# Patient Record
Sex: FEMALE | Race: WHITE | HISPANIC OR LATINO | Employment: UNEMPLOYED | URBAN - METROPOLITAN AREA
[De-identification: names, ages, dates, MRNs, and addresses within clinical notes are randomized per-mention and may not be internally consistent; named-entity substitution may affect disease eponyms.]

---

## 2023-02-08 ENCOUNTER — VBI (OUTPATIENT)
Dept: ADMINISTRATIVE | Facility: OTHER | Age: 28
End: 2023-02-08

## 2023-02-20 ENCOUNTER — OFFICE VISIT (OUTPATIENT)
Dept: FAMILY MEDICINE CLINIC | Facility: CLINIC | Age: 28
End: 2023-02-20

## 2023-02-20 VITALS
DIASTOLIC BLOOD PRESSURE: 80 MMHG | WEIGHT: 264 LBS | TEMPERATURE: 98.4 F | SYSTOLIC BLOOD PRESSURE: 129 MMHG | HEIGHT: 65 IN | BODY MASS INDEX: 43.99 KG/M2 | HEART RATE: 56 BPM

## 2023-02-20 DIAGNOSIS — Z59.819 HOUSING INSTABILITY: ICD-10-CM

## 2023-02-20 DIAGNOSIS — R20.0 NUMBNESS: Primary | ICD-10-CM

## 2023-02-20 DIAGNOSIS — Z23 NEED FOR INFLUENZA VACCINATION: ICD-10-CM

## 2023-02-20 DIAGNOSIS — R21 RASH: ICD-10-CM

## 2023-02-20 SDOH — ECONOMIC STABILITY - HOUSING INSECURITY: HOUSING INSTABILITY UNSPECIFIED: Z59.819

## 2023-02-20 NOTE — ASSESSMENT & PLAN NOTE
Chronic mildly pruritic pinpoint erythematous rash appearing on different parts of body, since 2012  Currently with rash on the left shoulder  Patient reports being worked up for scabies versus different conditions in the past with no answer  Has tried previous management including steroids with no improvement  Unclear etiology  Advised patient to continue adequate moisturization    Referral given to dermatology given chronic history and per patient's request

## 2023-02-20 NOTE — ASSESSMENT & PLAN NOTE
Right-sided 4th digit numbness, for the last 2 weeks  Phalen's test positive  Most likely appears to be carpal tunnel syndrome  Conservative management with wrist splint  Advised patient to follow-up with worsening of symptoms

## 2023-02-20 NOTE — PROGRESS NOTES
Name: Aurelio George      : 1995      MRN: [id number]  Encounter Provider: Anamika Pierce MD  Encounter Date: 2023   Encounter department: [address] [place] [address]  Numbness  Assessment & Plan:  Right-sided 4th digit numbness, for the last 2 weeks  Phalen's test positive  Most likely appears to be carpal tunnel syndrome  Conservative management with wrist splint  Advised patient to follow-up with worsening of symptoms  Orders:  -     Elastic Bandages & Supports (Wrist Splint/Right Medium) MISC; Use 2 (two) times a day    2  Need for influenza vaccination  -     influenza vaccine, quadrivalent, 0 5 mL, preservative-free, for adult and pediatric patients 6 mos+ (AFLURIA, FLUARIX, FLULAVAL, FLUZONE)    3  Rash  Assessment & Plan:  Chronic mildly pruritic pinpoint erythematous rash appearing on different parts of body, since   Currently with rash on the left shoulder  Patient reports being worked up for scabies versus different conditions in the past with no answer  Has tried previous management including steroids with no improvement  Unclear etiology  Advised patient to continue adequate moisturization  Referral given to dermatology given chronic history and per patient's request     Orders:  -     Ambulatory Referral to Dermatology; Future    4  Housing instability  -     Ambulatory Referral to Social Work Care Management Program; Future         Subjective      61-year-old female presents today to establish care  Patient complains of rash on the left shoulder  Reports she has had chronic rash for over 10 years, previously worked up with no clear etiology  Patient reports rash being mildly pruritic, previously has tried treatment options including steroids with no significant improvement  Patient also currently having housing instability issues  Has a son and reports difficulty in finding stable housing    Reports numbness of the fourth digit on the right hand, started 2 weeks ago, no recent trauma or injury  No weakness of the hand  No other acute concerns  Review of Systems   Constitutional: Negative for chills and fever  HENT: Negative for ear pain and sore throat  Eyes: Negative for pain and visual disturbance  Respiratory: Negative for cough and shortness of breath  Cardiovascular: Negative for chest pain and palpitations  Gastrointestinal: Negative for abdominal pain and vomiting  Genitourinary: Negative for difficulty urinating  Skin: Positive for rash  Negative for color change  Neurological: Positive for numbness  All other systems reviewed and are negative  No current outpatient medications on file prior to visit  Objective     /80 (BP Location: Left arm, Patient Position: Sitting, Cuff Size: Large)   Pulse 56   Temp 98 4 °F (36 9 °C) (Temporal)   Ht 5' 5" (1 651 m)   Wt 120 kg (264 lb)   BMI 43 93 kg/m²     Physical Exam  Vitals reviewed  Constitutional:       General: She is not in acute distress  Appearance: Normal appearance  HENT:      Head: Normocephalic and atraumatic  Eyes:      Conjunctiva/sclera: Conjunctivae normal    Cardiovascular:      Rate and Rhythm: Normal rate and regular rhythm  Pulses: Normal pulses  Heart sounds: Normal heart sounds  Pulmonary:      Effort: Pulmonary effort is normal  No respiratory distress  Musculoskeletal:         General: No swelling, tenderness, deformity or signs of injury  Normal range of motion  Right lower leg: No edema  Left lower leg: No edema  Comments: Phalen's positive right wrist   Skin:     General: Skin is warm and dry  Findings: Rash present  Comments: Erythematous rash pinpoint( due to scratching) on left shoulder  Neurological:      Mental Status: She is alert and oriented to person, place, and time        Gait: Gait normal    Psychiatric:         Mood and Affect: Mood normal  Behavior: Behavior normal        Rush Zelaya MD

## 2023-02-28 ENCOUNTER — OFFICE VISIT (OUTPATIENT)
Dept: OBGYN CLINIC | Facility: CLINIC | Age: 28
End: 2023-02-28

## 2023-02-28 VITALS
DIASTOLIC BLOOD PRESSURE: 72 MMHG | HEIGHT: 65 IN | BODY MASS INDEX: 44.89 KG/M2 | SYSTOLIC BLOOD PRESSURE: 138 MMHG | WEIGHT: 269.4 LBS | HEART RATE: 80 BPM

## 2023-02-28 DIAGNOSIS — Z59.819 HOUSING INSTABILITY: ICD-10-CM

## 2023-02-28 DIAGNOSIS — Z01.419 WOMEN'S ANNUAL ROUTINE GYNECOLOGICAL EXAMINATION: Primary | ICD-10-CM

## 2023-02-28 SDOH — ECONOMIC STABILITY - HOUSING INSECURITY: HOUSING INSTABILITY UNSPECIFIED: Z59.819

## 2023-02-28 NOTE — ASSESSMENT & PLAN NOTE
G/C & Pap smear collected 2/28/2023  Contraception: *  No other complaints at this time     Benign physical exam  F/u in 1 year or as needed

## 2023-02-28 NOTE — PROGRESS NOTES
Subjective      Dayan Cottrell is a 32 y o  female who presents for annual well woman exam  She does report some intermittent RLQ pain which is not severe and not persistent  She is unaware of any triggers or alleviating factors  GYN:  · Denies vaginal discharge, labial erythema or lesions  · Menarche at 10  · Menses are regular, q 28 days, lasting 3-4 days, but they are intermittently heavy - when she's got a heavy day she'll change a super pad about 5 times a day  · Contraception: Nothing  · Patient is not sexually active, denies any history dyspareunia  She has current low libido  · Has not received the Gardasil vaccine  · Denies gynecologic surgeries  OB:  ·  female  · Pregnancies were term delivery, had gestational hypertension  · Her son has autism    :  · Denies dysuria, urinary frequency or urgency  · Denies hematuria, flank pain  · Endorses stress incontinence  Breast:  · Denies breast mass, skin changes, dimpling, reddening, nipple retraction  · Denies breast discharge  · Patient does not have a family history of breast, endometrial, colon, or ovarian ca  General:  · Diet: She's got days when she eats and days when she doesn't  She doesn't eat sweets, is trying to drink less soda  · Exercise: walking with her son  · Work: Not currently  · ETOH use: never  · Tobacco use: denies  · Recreational drug use: denies  · She is having a number of social stressors- she's been homeless recently and has been caring for her autistic son    Screening:  · Cervical cancer: last pap smear 2014 per patient was normal  2015 - ASCUS +OHR HPV  · Breast cancer: Due to start age 34  · Colon cancer:Due to start age 46  · STD screening: declines  Review of Systems  Pertinent items are noted in HPI  Objective      /72   Pulse 80   Ht 5' 5" (1 651 m)   Wt 122 kg (269 lb 6 4 oz)   LMP 2023   BMI 44 83 kg/m²     Physical Exam  Exam conducted with a chaperone present  Constitutional:       General: She is not in acute distress  Appearance: She is well-developed  She is obese  She is not diaphoretic  HENT:      Head: Normocephalic and atraumatic  Eyes:      General: No scleral icterus  Right eye: No discharge  Left eye: No discharge  Conjunctiva/sclera: Conjunctivae normal    Neck:      Thyroid: No thyroid mass or thyroid tenderness  Cardiovascular:      Rate and Rhythm: Normal rate and regular rhythm  Heart sounds: Normal heart sounds  No murmur heard  No friction rub  No gallop  Pulmonary:      Effort: Pulmonary effort is normal  No respiratory distress  Breath sounds: Normal breath sounds  No stridor  No wheezing or rales  Chest:      Chest wall: No tenderness  Breasts:     Breasts are symmetrical       Right: Normal  No mass, nipple discharge, skin change or tenderness  Left: Normal  No mass, nipple discharge, skin change or tenderness  Abdominal:      General: There is no distension  Palpations: Abdomen is soft  There is no mass  Tenderness: There is no abdominal tenderness  There is no guarding or rebound  Hernia: No hernia is present  There is no hernia in the left inguinal area or right inguinal area  Genitourinary:     General: Normal vulva  Exam position: Supine  Labia:         Right: No rash, tenderness, lesion or injury  Left: No rash, tenderness, lesion or injury  Vagina: No signs of injury and foreign body  No vaginal discharge, erythema, tenderness or bleeding  Cervix: No cervical motion tenderness, discharge or friability  Uterus: Not deviated, not enlarged, not fixed and not tender  Adnexa:         Right: No fullness  Left: No fullness  Rectum: Normal       Comments: Parous cervix  No masses or tenderness appreciated on bimanual exam, limited by body habitus  Musculoskeletal:         General: No tenderness or deformity   Normal range of motion  Cervical back: Normal range of motion and neck supple  Right lower leg: No edema  Left lower leg: No edema  Lymphadenopathy:      Cervical: No cervical adenopathy  Upper Body:      Right upper body: No supraclavicular or axillary adenopathy  Left upper body: No supraclavicular or axillary adenopathy  Skin:     General: Skin is warm and dry  Findings: No rash  Neurological:      General: No focal deficit present  Mental Status: She is alert  Mental status is at baseline  Psychiatric:         Mood and Affect: Mood normal          Behavior: Behavior normal                  Assessment       Yumiko Roberson is a 32 y o  No obstetric history on file  who presents for annual well woman exam     Plan     1  Routine well woman exam done today  2   Pap and HPV: last performed 2015 - ASCUS OHR HPV+, per ASCCP guidelines, pap smear due today  Will call patient with results  3  The patient declined STD testing  Safe sex practices have been discussed  The patient is not sexually active  Contraception: briefly discussed options  Patient will return if she desires further discussion  4  The following were reviewed in today's visit: routine health wellness, stress urinary incontinence, HPV and pap smears, as well as depression  5  Stress incontinence: discussed that can review other interventions at future visits, but that some primary interventions include strengthening of pelvic floor with Kegels  6  Discussed HPV as sexually transmitted disease and implications in majority of cervical cancer cases  Discussed Gardasil vaccine as means of preventing HPV infection  She is interested  We do not currently have vaccine in stock, will call her when available  7  Discussed depression and social stressors  She has been seen recently by her PCP who put in for CMSW  She is amenable to being contacted by  for resources  8  BMI Counseling: Body mass index is 44 83 kg/m²   The BMI is above normal  Nutrition recommendations include decreasing overall calorie intake, consuming healthier snacks, decreasing soda and/or juice intake and increasing intake of lean protein  Exercise recommendations include moderate aerobic physical activity for 150 minutes/week  9  Depression Screening Follow-up Plan: Patient's depression screening was positive with a PHQ-2 score of 4  Their PHQ-9 score was 12  Patient assessed for underlying major depression  They have no active suicidal ideations  Brief counseling provided and recommend additional follow-up/re-evaluation next office visit  She is establishing care with mental health provider through PCP         Patient to return to office for discussion of RLQ pain, stress urinary incontinence, contraception, or Gardasil vaccination      Patient discussed with Dr Ana Toledo MD  OB/Gyn R-4  02/28/23

## 2023-03-01 ENCOUNTER — PATIENT OUTREACH (OUTPATIENT)
Dept: OBGYN CLINIC | Facility: CLINIC | Age: 28
End: 2023-03-01

## 2023-03-01 NOTE — PROGRESS NOTES
BEATRIS MANCILLA spoke with 33 y/o-S-G1- English speaking woman for assessment  Pt reported she was evicted form her apartment last January after she could not pay her rent that went up $400 00 more  Pt and 10 y/o autistic son are staying with her mom but need to find her own place by April 1st  Pt is calling 211 daily for shelter  Pt stated she met with Carmen Bonilla and they will help with security deposit and first month rent  Pt has not been able to find an apartment due to the recent eviction  Pt has MA, SNAP, SSI and recently filed for Child support  Pt is unemployed due to  issues  Pt  stated she has been trying to get Title XX unsuccessfully  Pt does have transportation needs  Pt also with high depression score with no SI / HI  BEATRIS MANCILLA explained her role and provided supportive counseling  BEATRIS MANCILLA discussed getting services for her son and provided with [facility] phone numbers  Pt will call to inquire about their services for children with Autism  BEATRIS MANCILLA discussed applying for [place] services and Pt will call back tomorrow to apply for the emergency services  BEATRIS MANCILLA discussed referral for [address] services and Pt was agreeable  BEATRIS MANCILLA offered assistance to schedule intake appointment but Pt requested the phone numbers to call herself  Pt denies other concerns and thanks BEATRIS MANCILLA for reaching out  Pt was encouraged to call at any time needed

## 2023-03-07 ENCOUNTER — TELEPHONE (OUTPATIENT)
Dept: OBGYN CLINIC | Facility: CLINIC | Age: 28
End: 2023-03-07

## 2023-03-07 ENCOUNTER — PATIENT OUTREACH (OUTPATIENT)
Dept: FAMILY MEDICINE CLINIC | Facility: CLINIC | Age: 28
End: 2023-03-07

## 2023-03-07 LAB
LAB AP GYN PRIMARY INTERPRETATION: NORMAL
Lab: NORMAL

## 2023-03-07 NOTE — TELEPHONE ENCOUNTER
Spoke with pt today , notified of normal pap, recommendation for screening in 3 years and importance of annual exams yearly

## 2023-03-07 NOTE — TELEPHONE ENCOUNTER
----- Message from Charline Granados MD sent at 3/7/2023 10:35 AM EST -----  Pap smear normal  Next pap smear in 3 years per ASCCP guidelines

## 2023-03-07 NOTE — PROGRESS NOTES
BEATRIS received a new referral in regard to pt with housing instability  BEATRIS contacted pt and introduced self and role  Pt shared that she and her son are currently staying in between her mothers and grandmothers home  Pt and her son are not homeless  She shares that she would prefer a place of her own, because her son is autistic and it is difficult for other people to live with him  Pts son receives SSI income of $914 00 per month and that is the only current income in the family  Pt has a job opportunity and is working with her [facility] casework for her son to obtain 21 for childcare so she can work  [facility] also [facility]  They have agreed to pay pts security deposit and first months rent for an apartment  Pt also has some income tax refund money saved  Pt has a prior eviction notice and has been unable to fins a landlord that will rent to her  She reports contacting 30 landlords and none will consider anyone with a prior eviction  Pts  at [facility] is also assisting pt in finding an apartment  Pt has spoke with the manager at [facility] and pt was advised to call at the end of April because they may have an opening at that time  Pt reports no other needs at this time  [facility]  Pt has an active  there that is assisting her  Pt will also continue to work with [facility]  to obtain title 20  BEATRIS did provide pt with resources for [facility] @ [phone] and [facility] @ [phone] form the [facility]  Pt can reach out to them for possible resources  Pt was advised to contact [facility] with any additional questions or concerns  BRIEN will close referral as pt is already working with appropriate community agencies

## 2023-03-15 ENCOUNTER — OFFICE VISIT (OUTPATIENT)
Dept: FAMILY MEDICINE CLINIC | Facility: CLINIC | Age: 28
End: 2023-03-15

## 2023-03-15 VITALS
TEMPERATURE: 97.9 F | OXYGEN SATURATION: 97 % | HEART RATE: 67 BPM | WEIGHT: 284.4 LBS | SYSTOLIC BLOOD PRESSURE: 120 MMHG | DIASTOLIC BLOOD PRESSURE: 60 MMHG | RESPIRATION RATE: 18 BRPM | BODY MASS INDEX: 47.38 KG/M2 | HEIGHT: 65 IN

## 2023-03-15 DIAGNOSIS — F32.89 OTHER DEPRESSION: ICD-10-CM

## 2023-03-15 DIAGNOSIS — Z00.00 ANNUAL PHYSICAL EXAM: Primary | ICD-10-CM

## 2023-03-15 DIAGNOSIS — Z11.1 PPD SCREENING TEST: ICD-10-CM

## 2023-03-15 DIAGNOSIS — R53.83 OTHER FATIGUE: ICD-10-CM

## 2023-03-15 PROBLEM — F32.A DEPRESSION: Status: ACTIVE | Noted: 2023-03-15

## 2023-03-15 RX ORDER — ESCITALOPRAM OXALATE 10 MG/1
10 TABLET ORAL DAILY
Qty: 30 TABLET | Refills: 1 | Status: SHIPPED | OUTPATIENT
Start: 2023-03-15 | End: 2023-05-14

## 2023-03-15 NOTE — ASSESSMENT & PLAN NOTE
Patient with a PHQ-9 score of 11  Patient has no SI/HI today on exam   Reports that she has been feeling well  She has a son who is autistic that she cares for  She reports having difficulty falling asleep and is reporting fatigue  On exam patient has flat affect  We will start patient on Lexapro 10 mg with a 6-week follow-up  Additionally encourage patient to schedule appointment with Dr Rajinder Angelo for behavioral counseling

## 2023-03-15 NOTE — ASSESSMENT & PLAN NOTE
Patient here for annual physical  Immunizations and preventive care screenings were discussed with patient today  Appropriate education was printed on patient's after visit summary  Counseling:  Alcohol/drug use: discussed moderation in alcohol intake, the recommendations for healthy alcohol use, and avoidance of illicit drug use  Dental Health: discussed importance of regular tooth brushing, flossing, and dental visits  Sexual health: discussed sexually transmitted diseases, partner selection, use of condoms, avoidance of unintended pregnancy, and contraceptive alternatives  · Exercise: the importance of regular exercise/physical activity was discussed  Recommend exercise 3-5 times per week for at least 30 minutes

## 2023-03-15 NOTE — ASSESSMENT & PLAN NOTE
Patient with a history of fatigue over the past year  Her fatigue is likely secondary to her depression  PHQ-9 score of 11  However would like to rule out medical causes of fatigue  CBC, CMP and thyroid ordered  Patient will have 6-week follow-up for depression and fatigue  Patient denies any hot or cold intolerance, increased or decreased appetite, thinning of the hair, lightheadedness or dizziness

## 2023-03-15 NOTE — PROGRESS NOTES
[facility]    NAME: Piero Gallo  AGE: 32 y o  SEX: female  : 1995     DATE: 3/15/2023     Assessment and Plan:     Problem List Items Addressed This Visit        Other    Annual physical exam - Primary     Patient here for annual physical  Immunizations and preventive care screenings were discussed with patient today  Appropriate education was printed on patient's after visit summary  Counseling:  Alcohol/drug use: discussed moderation in alcohol intake, the recommendations for healthy alcohol use, and avoidance of illicit drug use  Dental Health: discussed importance of regular tooth brushing, flossing, and dental visits  Sexual health: discussed sexually transmitted diseases, partner selection, use of condoms, avoidance of unintended pregnancy, and contraceptive alternatives  · Exercise: the importance of regular exercise/physical activity was discussed  Recommend exercise 3-5 times per week for at least 30 minutes  Other fatigue     Patient with a history of fatigue over the past year  Her fatigue is likely secondary to her depression  PHQ-9 score of 11  However would like to rule out medical causes of fatigue  CBC, CMP and thyroid ordered  Patient will have 6-week follow-up for depression and fatigue  Patient denies any hot or cold intolerance, increased or decreased appetite, thinning of the hair, lightheadedness or dizziness  Relevant Orders    CBC and Platelet    TSH, 3rd generation with Free T4 reflex    Comprehensive metabolic panel    Depression     Patient with a PHQ-9 score of 11  Patient has no SI/HI today on exam   Reports that she has been feeling well  She has a son who is autistic that she cares for  She reports having difficulty falling asleep and is reporting fatigue  On exam patient has flat affect    We will start patient on Lexapro 10 mg with a 6-week follow-up  Additionally encourage patient to schedule appointment with Dr Sonali Mauricio for behavioral counseling  Relevant Medications    escitalopram (Lexapro) 10 mg tablet    PPD screening test     Patient required PPD along with physical form for work  PPD placed patient will have to come back Friday between 230 and 3 for PPD to be read  Form will be in red folder  Relevant Orders    TB Skin Test (Completed)         Depression Screening and Follow-up Plan: Patient's depression screening was positive with a PHQ-2 score of 3  Their PHQ-9 score was 11  Patient assessed for underlying major depression  Brief counseling provided and recommend additional follow-up/re-evaluation next office visit  Return in about 6 weeks (around 2023) for depression   Chief Complaint:     Chief Complaint   Patient presents with   • Physical Exam     Patient bring Physical from work      History of Present Illness:     Adult Annual Physical   Patient here for a comprehensive physical exam  The patient reports problems - of  Fatigue  Diet and Physical Activity  · Diet/Nutrition: well balanced diet  · Exercise: no formal exercise  Depression Screening  PHQ-2/9 Depression Screening    Little interest or pleasure in doing things: 1 - several days  Feeling down, depressed, or hopeless: 2 - more than half the days  Trouble falling or staying asleep, or sleeping too much: 3 - nearly every day  Feeling tired or having little energy: 3 - nearly every day  Poor appetite or overeatin - not at all  Feeling bad about yourself - or that you are a failure or have let yourself or your family down: 1 - several days  Trouble concentrating on things, such as reading the newspaper or watching television: 0 - not at all  Moving or speaking so slowly that other people could have noticed   Or the opposite - being so fidgety or restless that you have been moving around a lot more than usual: 1 - several days  Thoughts that you would be better off dead, or of hurting yourself in some way: 0 - not at all  PHQ-2 Score: 3  PHQ-2 Interpretation: POSITIVE depression screen  PHQ-9 Score: 11   PHQ-9 Interpretation: Moderate depression        General Health  · Sleep: patient sleeps about 6-7 hours a day      · Hearing: normal - bilateral   · Vision: most recent eye exam <1 year ago and wears glasses  · Dental: no dental visits for >1 year and brushes teeth twice daily  /GYN Health  · Last menstrual period: 02/28/2023  · Contraceptive method: none  · History of STDs?: no      Review of Systems:     Review of Systems   Constitutional: Positive for fatigue  Negative for appetite change, chills, fever and unexpected weight change  HENT: Negative for ear pain and sore throat  Eyes: Negative for pain and visual disturbance  Respiratory: Negative for cough and shortness of breath  Cardiovascular: Negative for chest pain and palpitations  Gastrointestinal: Negative for abdominal pain, diarrhea, nausea and vomiting  Genitourinary: Negative for dysuria and hematuria  Musculoskeletal: Negative for arthralgias and back pain  Skin: Negative for color change and rash  Neurological: Negative for seizures and syncope  All other systems reviewed and are negative       Past Medical History:     Past Medical History:   Diagnosis Date   • Hypertension affecting pregnancy 2017      Past Surgical History:     Past Surgical History:   Procedure Laterality Date   • WISDOM TOOTH EXTRACTION  2018      Social History:     Social History     Socioeconomic History   • Marital status: Single     Spouse name: None   • Number of children: 1   • Years of education: 12   • Highest education level: High school graduate   Occupational History   • None   Tobacco Use   • Smoking status: Never   • Smokeless tobacco: Never   Vaping Use   • Vaping Use: Never used   Substance and Sexual Activity   • Alcohol use: Never   • Drug use: Never   • Sexual activity: Not Currently     Birth control/protection: None   Other Topics Concern   • None   Social History Narrative   • None     Social Determinants of Health     Financial Resource Strain: Medium Risk   • Difficulty of Paying Living Expenses: Somewhat hard   Food Insecurity: Food Insecurity Present   • Worried About Running Out of Food in the Last Year: Often true   • Ran Out of Food in the Last Year: Often true   Transportation Needs: Unmet Transportation Needs   • Lack of Transportation (Medical): Yes   • Lack of Transportation (Non-Medical): Yes   Physical Activity: Not on file   Stress: Stress Concern Present   • Feeling of Stress : To some extent   Social Connections: Not on file   Intimate Partner Violence: Not At Risk   • Fear of Current or Ex-Partner: No   • Emotionally Abused: No   • Physically Abused: No   • Sexually Abused: No   Housing Stability: High Risk   • Unable to Pay for Housing in the Last Year: Yes   • Number of Places Lived in the Last Year: 3   • Unstable Housing in the Last Year: Yes      Family History:     Family History   Problem Relation Age of Onset   • Lupus Mother    • No Known Problems Father    • Diabetes Maternal Grandmother       Current Medications:     Current Outpatient Medications   Medication Sig Dispense Refill   • Elastic Bandages & Supports (Wrist Splint/Right Medium) MISC Use 2 (two) times a day 1 each 0   • escitalopram (Lexapro) 10 mg tablet Take 1 tablet (10 mg total) by mouth daily 30 tablet 1     No current facility-administered medications for this visit  Allergies:     No Known Allergies   Physical Exam:     /60 (BP Location: Left arm, Patient Position: Sitting, Cuff Size: Large)   Pulse 67   Temp 97 9 °F (36 6 °C) (Temporal)   Resp 18   Ht 5' 5" (1 651 m)   Wt 129 kg (284 lb 6 4 oz)   LMP  (LMP Unknown)   SpO2 97%   BMI 47 33 kg/m²     Physical Exam  Vitals and nursing note reviewed     Constitutional:       General: She is not in acute distress  Appearance: She is well-developed  HENT:      Head: Normocephalic and atraumatic  Eyes:      Conjunctiva/sclera: Conjunctivae normal    Cardiovascular:      Rate and Rhythm: Normal rate and regular rhythm  Heart sounds: Normal heart sounds  No murmur heard  Pulmonary:      Effort: Pulmonary effort is normal  No respiratory distress  Breath sounds: Normal breath sounds  No wheezing  Abdominal:      General: There is no distension  Palpations: Abdomen is soft  Tenderness: There is no abdominal tenderness  There is no guarding  Musculoskeletal:         General: No swelling  Cervical back: Neck supple  Skin:     General: Skin is warm and dry  Capillary Refill: Capillary refill takes less than 2 seconds  Neurological:      Mental Status: She is alert     Psychiatric:         Mood and Affect: Mood normal       Comments: Flat affect           Ni Rodriguez MD   [address]

## 2023-03-15 NOTE — ASSESSMENT & PLAN NOTE
Patient required PPD along with physical form for work  PPD placed patient will have to come back Friday between 230 and 3 for PPD to be read  Form will be in red folder

## 2023-03-17 ENCOUNTER — CLINICAL SUPPORT (OUTPATIENT)
Dept: FAMILY MEDICINE CLINIC | Facility: CLINIC | Age: 28
End: 2023-03-17

## 2023-03-17 DIAGNOSIS — Z11.1 ENCOUNTER FOR PPD SKIN TEST READING: Primary | ICD-10-CM

## 2023-03-17 LAB
INDURATION: 0 MM
TB SKIN TEST: NEGATIVE

## 2023-03-20 ENCOUNTER — TELEPHONE (OUTPATIENT)
Dept: FAMILY MEDICINE CLINIC | Facility: CLINIC | Age: 28
End: 2023-03-20

## 2023-03-20 NOTE — TELEPHONE ENCOUNTER
Physical reviewed and signed by Dr Mekhi Galdamez  Form placed in the Red Folder in the [address] [place] [address] for scan   Thanks

## 2023-05-12 ENCOUNTER — TELEPHONE (OUTPATIENT)
Dept: FAMILY MEDICINE CLINIC | Facility: CLINIC | Age: 28
End: 2023-05-12

## 2023-05-12 DIAGNOSIS — F32.89 OTHER DEPRESSION: ICD-10-CM

## 2023-05-12 NOTE — TELEPHONE ENCOUNTER
Patient is calling and is not interested in having an appointment with Dr Geoffrey Matson at this time      Patient can be reached at [phone]

## 2023-05-12 NOTE — TELEPHONE ENCOUNTER
Patient had gotten medication   escitalopram (Lexapro) 10 mg tablet     And wants dosage increaded to 15 MG    Patient can be reached at [phone]

## 2023-05-12 NOTE — TELEPHONE ENCOUNTER
On last encounter when this medication was initiated 3/15/23 patient was told to follow up and no appt was scheduled  Please contact patient to scheduled her for Depression follow up  Thank You  Depression        Patient with a PHQ-9 score of 11  Patient has no SI/HI today on exam   Reports that she has been feeling well  She has a son who is autistic that she cares for  She reports having difficulty falling asleep and is reporting fatigue  On exam patient has flat affect  We will start patient on Lexapro 10 mg with a 6-week follow-up  Additionally encourage patient to schedule appointment with Dr Krystyna Mcnair for behavioral counseling

## 2023-05-14 PROBLEM — Z11.1 PPD SCREENING TEST: Status: RESOLVED | Noted: 2023-03-15 | Resolved: 2023-05-14

## 2023-05-15 NOTE — TELEPHONE ENCOUNTER
Patient need to be scheduled with pcp for follow up Depression to be able to continue with medication refills  Please contact patient to set up an appt  Thank you

## 2023-05-18 RX ORDER — ESCITALOPRAM OXALATE 10 MG/1
TABLET ORAL
Qty: 30 TABLET | Refills: 1 | Status: SHIPPED | OUTPATIENT
Start: 2023-05-18 | End: 2023-05-19 | Stop reason: SDUPTHER

## 2023-05-18 NOTE — TELEPHONE ENCOUNTER
Attempted to contact pt to schedule a follow for depression unable to leave VM please approve or deny Rx request-thank you

## 2023-05-19 ENCOUNTER — OFFICE VISIT (OUTPATIENT)
Dept: FAMILY MEDICINE CLINIC | Facility: CLINIC | Age: 28
End: 2023-05-19

## 2023-05-19 VITALS
WEIGHT: 284.8 LBS | SYSTOLIC BLOOD PRESSURE: 130 MMHG | DIASTOLIC BLOOD PRESSURE: 77 MMHG | TEMPERATURE: 97.9 F | HEART RATE: 76 BPM | BODY MASS INDEX: 47.39 KG/M2 | OXYGEN SATURATION: 99 % | RESPIRATION RATE: 18 BRPM

## 2023-05-19 DIAGNOSIS — F32.89 OTHER DEPRESSION: ICD-10-CM

## 2023-05-19 RX ORDER — ESCITALOPRAM OXALATE 20 MG/1
20 TABLET ORAL DAILY
Qty: 30 TABLET | Refills: 0 | Status: SHIPPED | OUTPATIENT
Start: 2023-05-19 | End: 2023-05-19 | Stop reason: SDUPTHER

## 2023-05-19 RX ORDER — ESCITALOPRAM OXALATE 20 MG/1
20 TABLET ORAL DAILY
Qty: 90 TABLET | Refills: 0 | Status: SHIPPED | OUTPATIENT
Start: 2023-05-19 | End: 2023-08-17

## 2023-05-19 NOTE — ASSESSMENT & PLAN NOTE
Improving per patient since starting on Lexapro 10 mg daily about 8 weeks ago  PHQ-9 not assessed today due to time constraints  Will increase Lexapro to 20 mg daily  Follow-up in 6 weeks with PCP

## 2023-05-19 NOTE — PROGRESS NOTES
Name: Jessy Saeed      : 1995      MRN: [id number]  Encounter Provider: Eliazar Pepper MD  Encounter Date: 2023   Encounter department: [address] [place] [address]  Other depression  Assessment & Plan:  Improving per patient since starting on Lexapro 10 mg daily about 8 weeks ago  PHQ-9 not assessed today due to time constraints  Will increase Lexapro to 20 mg daily  Follow-up in 6 weeks with PCP  Orders:  -     escitalopram (LEXAPRO) 20 mg tablet; Take 1 tablet (20 mg total) by mouth daily       Subjective      Luis Felipe Clark is a 67-year-old female with history of depression  Patient was recently started on Lexapro 10 mg daily about 8 weeks ago  Patient is presenting today for follow-up  She is tolerating Lexapro without issues  She does report significantly improved symptoms since starting on Lexapro  She states that she does not get enraged and argue endlessly like before  However, she reports that she feels the effects wanes quickly and is not quite there yet and is hoping to have her dose slightly increased  PHQ-9 was not accessed today due to time constraint  No other reported acute concerns  Review of Systems   Constitutional: Negative for fatigue and fever  Respiratory: Negative for cough, shortness of breath and wheezing  Cardiovascular: Negative for chest pain and leg swelling  Gastrointestinal: Negative for abdominal pain, nausea and vomiting  Skin: Negative for rash  Neurological: Negative for headaches         Current Outpatient Medications on File Prior to Visit   Medication Sig   • Elastic Bandages & Supports (Wrist Splint/Right Medium) MISC Use 2 (two) times a day   • [DISCONTINUED] escitalopram (LEXAPRO) 10 mg tablet TAKE 1 TABLET BY MOUTH EVERY DAY       Objective     /77   Pulse 76   Temp 97 9 °F (36 6 °C)   Resp 18   Wt 129 kg (284 lb 12 8 oz)   SpO2 99%   BMI 47 39 kg/m²     Physical Exam  Constitutional:       General: She is not in acute distress  Appearance: She is obese  She is not ill-appearing or diaphoretic  HENT:      Head: Normocephalic and atraumatic  Eyes:      Conjunctiva/sclera: Conjunctivae normal    Cardiovascular:      Rate and Rhythm: Normal rate  Pulmonary:      Effort: Pulmonary effort is normal  No respiratory distress  Musculoskeletal:         General: No swelling, tenderness, deformity or signs of injury  Normal range of motion  Right lower leg: No edema  Left lower leg: No edema  Skin:     General: Skin is warm  Findings: No rash  Neurological:      Mental Status: She is alert         Jeovanny Perez MD

## 2023-08-13 DIAGNOSIS — F32.89 OTHER DEPRESSION: ICD-10-CM

## 2023-08-14 RX ORDER — ESCITALOPRAM OXALATE 20 MG/1
20 TABLET ORAL DAILY
Qty: 90 TABLET | Refills: 0 | Status: SHIPPED | OUTPATIENT
Start: 2023-08-14

## 2023-11-21 DIAGNOSIS — F32.89 OTHER DEPRESSION: ICD-10-CM

## 2023-11-21 RX ORDER — ESCITALOPRAM OXALATE 20 MG/1
20 TABLET ORAL DAILY
Qty: 90 TABLET | Refills: 0 | Status: SHIPPED | OUTPATIENT
Start: 2023-11-21